# Patient Record
Sex: FEMALE | Race: WHITE
[De-identification: names, ages, dates, MRNs, and addresses within clinical notes are randomized per-mention and may not be internally consistent; named-entity substitution may affect disease eponyms.]

---

## 2021-06-19 ENCOUNTER — HOSPITAL ENCOUNTER (EMERGENCY)
Dept: HOSPITAL 50 - VM.ED | Age: 26
Discharge: HOME | End: 2021-06-19
Payer: COMMERCIAL

## 2021-06-19 DIAGNOSIS — E87.6: ICD-10-CM

## 2021-06-19 DIAGNOSIS — R00.2: ICD-10-CM

## 2021-06-19 DIAGNOSIS — R00.0: Primary | ICD-10-CM

## 2021-06-19 LAB
ANION GAP SERPL CALC-SCNC: 18.2 MMOL/L (ref 5–15)
BARBITURATES UR QL SCN: NEGATIVE
BENZODIAZ UR QL SCN: NEGATIVE
CHLORIDE SERPL-SCNC: 99 MMOL/L (ref 98–107)
METHADONE UR QL SCN: NEGATIVE
SODIUM SERPL-SCNC: 137 MMOL/L (ref 136–145)
THC UR QL SCN>50 NG/ML: NEGATIVE

## 2021-06-19 NOTE — CR
______________________________________________________________________________   

  

[id number] RAD/RAD Chest PA And Lateral  

EXAM: FRONTAL AND LATERAL CHEST  

   

 INDICATION: CHEST PAIN.  

   

 COMPARISON: None.  

   

 DISCUSSION: The heart and lungs are normal in appearance.  

   

 IMPRESSION:    

 1.  Negative exam.  

   

 Electronically signed by Rajat Lagos MD on 6/19/2021 10:51 AM  

   

  

Rajat Lagos MD                 

 06/19/21 1053    

  

Thank you for allowing us to participate in the care of your patient.

## 2021-06-19 NOTE — EDM.PDOC
ED HPI GENERAL MEDICAL PROBLEM





- General


Stated Complaint: DIZZY,LIGHT HEADED


Time Seen by Provider: 06/19/21 09:30


Source of Information: Reports: Patient


History Limitations: Reports: No Limitations





- History of Present Illness


INITIAL COMMENTS - FREE TEXT/NARRATIVE: 





Patient presents to the ED for a feeling of her heart racing, feeling nauseated 

and light headed.  She works at a gas station, is  and is a smoker.  LMP 

5/22.  Denies any new chemical exposure., no recent travel and no recent 

illness.  She states about a week ago she had onset of these symptoms.  They 

were sporadic and not tied to certain activity or time of day.  Denies any new 

over the counter supplements, rare use of alcohol, no illicit drug use, no tick 

or bug bites.  Has not had covid, nor the vaccines.  She became concerned about 

the symptoms and saw her provider 3 days ago.  She was checked for pregnancy and

told it was negative.  She was put on steroids for some nasal inflammation, with

concerns of allergies and given meclizine for concern for her dizziness.  She is

taking 20 mg of prednisone daily last use was last night.  She is using the 

meclizine, last use was last night.  She does not feel the medications are 

helping.  She got up today, had her coffee and some almonds and went to work.  

Felt nauseated and light headed.  Heart feels like it is racing.  Decided to 

come in today.  Denies vaping, no energy drink use.  No family history of 

stroke, heart attack or heart problems


Onset Date: 06/12/21


Duration: Hour(s):, Getting Worse, Intermittent, Recurring


Severity: Moderate


Improves with: Reports: None


Associated Symptoms: Reports: Nausea/Vomiting, Other (lightheaded)





- Related Data


                                    Allergies











Allergy/AdvReac Type Severity Reaction Status Date / Time


 


No Known Allergies Allergy   Verified 06/19/21 09:57











Home Meds: 


                                    Home Meds





Meclizine [Antivert] 25 mg PO TID PRN 06/19/21 [History]











ED ROS GENERAL





- Review of Systems


Review Of Systems: See Below


Constitutional: Reports: No Symptoms


HEENT: Reports: No Symptoms


Respiratory: Reports: No Symptoms


Cardiovascular: Reports: Lightheadedness, Palpitations


Endocrine: Reports: No Symptoms


GI/Abdominal: Reports: No Symptoms


: Reports: No Symptoms


Musculoskeletal: Reports: No Symptoms


Skin: Reports: No Symptoms


Neurological: Reports: No Symptoms


Psychiatric: Reports: No Symptoms


Hematologic/Lymphatic: Reports: No Symptoms


Immunologic: Reports: No Symptoms





ED EXAM, GENERAL





- Physical Exam


Exam: See Below


Exam Limited By: No Limitations


General Appearance: Alert, No Apparent Distress, Anxious


Eye Exam: Bilateral Eye: Other (PERRLA, EOM, no nystagmus)


Ears: Normal External Exam, Normal Canal, Normal TMs


Ear Exam: Bilateral Ear: Auricle Normal


Nose: Normal Inspection, Normal Mucosa, No Blood


Throat/Mouth: Normal Inspection, Normal Lips, Normal Teeth, Other (mild dry 

mucous membranes)


Head: Atraumatic


Neck: Normal Inspection, Supple, Non-Tender.  No: Thyromegaly


Respiratory/Chest: No Respiratory Distress, Lungs Clear, Normal Breath Sounds


Cardiovascular: Normal Peripheral Pulses, No Edema, No Murmur, Tachycardia, 

Extra Beats


GI/Abdominal: Normal Bowel Sounds, Soft, Non-Tender, No Organomegaly, No 

Distention


 (Female) Exam: Deferred


Extremities: Normal Inspection, Normal Range of Motion, Non-Tender, No Pedal 

Edema


Neurological: Alert, Oriented, CN II-XII Intact, Normal Cognition, Normal Gait, 

No Motor/Sensory Deficits


Psychiatric: Normal Affect


Skin Exam: Warm, Dry, Intact, Normal Color, No Rash


  ** #1 Interpretation


EKG Date: 06/19/21


Time: 09:25


Rhythm: Other (trigeminy)


Rate (Beats/Min): 109


Axis: Normal


QT: Prolonged (499)


Comparison: NA - No Prior EKG


EKG Interpretation Comments: 





frequent PVC





Course





- Vital Signs


Last Recorded V/S: 


                                Last Vital Signs











Temp  36.5 C   06/19/21 09:20


 


Pulse  98   06/19/21 09:50


 


Resp  16   06/19/21 09:50


 


BP  168/90 H  06/19/21 09:50


 


Pulse Ox  99   06/19/21 09:50














- Orders/Labs/Meds


Orders: 


                               Active Orders 24 hr











 Category Date Time Status


 


 Cardiac Monitoring [RC] .AS DIRECTED Care  06/19/21 09:36 Ordered


 


 EKG 12 Lead [EKG Documentation Completion] [RC] STAT Care  06/19/21 09:19 

Active


 


 Chest 2V [CR] Stat Exams  06/19/21 10:11 Ordered











Labs: 


                                Laboratory Tests











  06/19/21 06/19/21 06/19/21 Range/Units





  09:40 09:40 09:40 


 


WBC  9.8    (4.0-10.0)  x10^3/uL


 


RBC  4.51    (4.00-5.50)  x10^6/uL


 


Hgb  14.5    (12.0-16.0)  g/dL


 


Hct  41.4    (33.0-47.0)  %


 


MCV  91.8    (78.0-93.0)  fL


 


MCH  32.2 H    (26.0-32.0)  pg


 


MCHC  35.0    (32.0-36.0)  g/dL


 


RDW Coeff of Pauly  12.2    (10.0-15.0)  %


 


Plt Count  270    (130-400)  x10^3/uL


 


Neut % (Auto)  68.5    (50.0-80.0)  %


 


Lymph % (Auto)  23.8 L    (25.0-50.0)  %


 


Mono % (Auto)  7.3    (2.0-11.0)  %


 


Eos % (Auto)  0.1    (0.0-4.0)  %


 


Baso % (Auto)  0.3    (0.2-1.2)  %


 


D-Dimer, Quantitative   0.22   (<=0.58)  mg/LFEU


 


Sodium    137  (136-145)  mmol/L


 


Potassium    3.2 L  (3.5-5.1)  mmol/L


 


Chloride    99  ()  mmol/L


 


Carbon Dioxide    23  (21-32)  mmol/L


 


Anion Gap    18.2 H  (5-15)  mmol/L


 


BUN    13  (7-18)  mg/dL


 


Creatinine    0.8  (0.55-1.02)  mg/dL


 


Est Cr Clr Drug Dosing    106.56  mL/min


 


Estimated GFR (MDRD)    > 60  


 


Glucose    89  (70-99)  mg/dL


 


Calcium    8.8  (8.5-10.1)  mg/dL


 


Corrected Calcium    8.7  (8.5-10.1)  mg/dL


 


Magnesium    2.0  (1.8-2.4)  mg/dL


 


Total Bilirubin    0.7  (0.2-1.0)  mg/dL


 


AST    17  (15-37)  U/L


 


ALT    17  (14-59)  U/L


 


Alkaline Phosphatase    58  ()  U/L


 


Troponin I High Sens    < 4  (<=51)  ng/L


 


Total Protein    7.9  (6.4-8.2)  g/dL


 


Albumin    4.1  (3.4-5.0)  g/dL


 


Globulin    3.8  


 


Albumin/Globulin Ratio    1.08  


 


TSH, Ultra Sensitive    1.514  (0.358-3.74)  uIU/mL


 


Urine Color     (YELLOW)  


 


Urine Appearance     (CLEAR)  


 


Urine pH     (5.0-8.0)  


 


Ur Specific Gravity     


 


Urine Protein     (NEGATIVE)  mg/dL


 


Urine Glucose (UA)     (NEGATIVE)  mg/dL


 


Urine Ketones     (NEGATIVE)  mg/dL


 


Urine Occult Blood     (NEGATIVE)  


 


Urine Nitrite     (NEGATIVE)  


 


Urine Bilirubin     (NEGATIVE)  


 


Urine Urobilinogen     (0.2)  EU/dL


 


Ur Leukocyte Esterase     (NEGATIVE)  


 


Urine HCG, Qual     (NEGATIVE)  


 


Urine Opiates Screen     (NEGATIVE)  


 


Ur Buprenorphine Scrn     (NEGATIVE)  


 


Ur Oxycodone Screen     (NEGATIVE)  


 


Urine Methadone Screen     (NEGATIVE)  


 


Ur Barbiturates Screen     (NEGATIVE)  


 


Ur Phencyclidine Scrn     (NEGATIVE)  


 


Ur Amphetamine Screen     (NEGATIVE)  


 


U Methamphetamines Scrn     (NEGATIVE)  


 


Urine MDMA Screen     (NEGATIVE)  


 


U Benzodiazepines Scrn     (NEGATIVE)  


 


U Cocaine Metab Screen     (NEGATIVE)  


 


U Marijuana (THC) Screen     (NEGATIVE)  


 


Ethyl Alcohol    < 3  (0-3)  mg/dL














  06/19/21 06/19/21 06/19/21 Range/Units





  09:50 09:50 09:50 


 


WBC     (4.0-10.0)  x10^3/uL


 


RBC     (4.00-5.50)  x10^6/uL


 


Hgb     (12.0-16.0)  g/dL


 


Hct     (33.0-47.0)  %


 


MCV     (78.0-93.0)  fL


 


MCH     (26.0-32.0)  pg


 


MCHC     (32.0-36.0)  g/dL


 


RDW Coeff of Pauly     (10.0-15.0)  %


 


Plt Count     (130-400)  x10^3/uL


 


Neut % (Auto)     (50.0-80.0)  %


 


Lymph % (Auto)     (25.0-50.0)  %


 


Mono % (Auto)     (2.0-11.0)  %


 


Eos % (Auto)     (0.0-4.0)  %


 


Baso % (Auto)     (0.2-1.2)  %


 


D-Dimer, Quantitative     (<=0.58)  mg/LFEU


 


Sodium     (136-145)  mmol/L


 


Potassium     (3.5-5.1)  mmol/L


 


Chloride     ()  mmol/L


 


Carbon Dioxide     (21-32)  mmol/L


 


Anion Gap     (5-15)  mmol/L


 


BUN     (7-18)  mg/dL


 


Creatinine     (0.55-1.02)  mg/dL


 


Est Cr Clr Drug Dosing     mL/min


 


Estimated GFR (MDRD)     


 


Glucose     (70-99)  mg/dL


 


Calcium     (8.5-10.1)  mg/dL


 


Corrected Calcium     (8.5-10.1)  mg/dL


 


Magnesium     (1.8-2.4)  mg/dL


 


Total Bilirubin     (0.2-1.0)  mg/dL


 


AST     (15-37)  U/L


 


ALT     (14-59)  U/L


 


Alkaline Phosphatase     ()  U/L


 


Troponin I High Sens     (<=51)  ng/L


 


Total Protein     (6.4-8.2)  g/dL


 


Albumin     (3.4-5.0)  g/dL


 


Globulin     


 


Albumin/Globulin Ratio     


 


TSH, Ultra Sensitive     (0.358-3.74)  uIU/mL


 


Urine Color  Yellow    (YELLOW)  


 


Urine Appearance  Clear    (CLEAR)  


 


Urine pH  6.5    (5.0-8.0)  


 


Ur Specific Gravity  1.010    


 


Urine Protein  Negative    (NEGATIVE)  mg/dL


 


Urine Glucose (UA)  Negative    (NEGATIVE)  mg/dL


 


Urine Ketones  Negative    (NEGATIVE)  mg/dL


 


Urine Occult Blood  Negative    (NEGATIVE)  


 


Urine Nitrite  Negative    (NEGATIVE)  


 


Urine Bilirubin  Negative    (NEGATIVE)  


 


Urine Urobilinogen  0.2    (0.2)  EU/dL


 


Ur Leukocyte Esterase  Negative    (NEGATIVE)  


 


Urine HCG, Qual    Negative  (NEGATIVE)  


 


Urine Opiates Screen   Negative   (NEGATIVE)  


 


Ur Buprenorphine Scrn   Negative   (NEGATIVE)  


 


Ur Oxycodone Screen   Negative   (NEGATIVE)  


 


Urine Methadone Screen   Negative   (NEGATIVE)  


 


Ur Barbiturates Screen   Negative   (NEGATIVE)  


 


Ur Phencyclidine Scrn   Negative   (NEGATIVE)  


 


Ur Amphetamine Screen   Negative   (NEGATIVE)  


 


U Methamphetamines Scrn   Negative   (NEGATIVE)  


 


Urine MDMA Screen   Negative   (NEGATIVE)  


 


U Benzodiazepines Scrn   Negative   (NEGATIVE)  


 


U Cocaine Metab Screen   Negative   (NEGATIVE)  


 


U Marijuana (THC) Screen   Negative   (NEGATIVE)  


 


Ethyl Alcohol     (0-3)  mg/dL











Meds: 


Medications














Discontinued Medications














Generic Name Dose Route Start Last Admin





  Trade Name Freq  PRN Reason Stop Dose Admin


 


Sodium Chloride  1,000 mls @ 999 mls/hr  06/19/21 09:38  06/19/21 09:46





  Normal Saline  IV  06/19/21 10:38  999 mls/hr





  ONETIME ONE   Administration


 


Ondansetron HCl  4 mg  06/19/21 09:38  06/19/21 09:50





  Ondansetron 4 Mg/2 Ml Sdv  IVPUSH  06/19/21 09:39  4 mg





  ONETIME ONE   Administration


 


Potassium Chloride  40 meq  06/19/21 10:26  06/19/21 10:39





  Potassium Chloride 20 Meq Tab.Er  PO  06/19/21 10:27  Not Given





  ONETIME ONE  


 


Potassium Chloride  40 meq  06/19/21 10:35  06/19/21 10:39





  Potassium Chloride 10% 20 Meq/15 Ml Soln 15 Ml Ud Cup  PO  06/19/21 10:36  40 

meq





  ONETIME ONE   Administration














- Radiology Interpretation


Free Text/Narrative:: 





chest x-ray interpreted by radiology as negative





- Re-Assessments/Exams


Free Text/Narrative Re-Assessment/Exam: 





06/19/21 09:49


Patient presents with mildl tachycardia, lightheadedness.  Will give IV fluids 

as clinically dry and dehydration could be leading to her symptoms. Has mild 

nausea, will give zofran 4 mg IVP.  Has ventricular trigeminy on EKG, frequent 

PVC.  Will keep on monitor, check labs to include troponin, d dimer, tsh, 

alcohol and drug screen along with urine pregnancy.  Normal neurological exam. 


06/19/21 10:03


heart rate 88 and no pvcs now.  normal sinus, fluids infusing normal WBC


06/19/21 10:40


Been up to the bathroom several times., gap is elevated and potassium is low.  

encourage hydration, will give 40 meq potassium po ( liquid) can't take pills). 

Discussed with patient potassium rich foods, follow up with PCP for possible 

holter monitor and hydration. 





Departure





- Departure


Time of Disposition: 10:41


Disposition: Home, Self-Care 01


Condition: Good


Clinical Impression: 


 Palpitation, Tachycardia, Hypokalemia








- Discharge Information


*PRESCRIPTION DRUG MONITORING PROGRAM REVIEWED*: Not Applicable


*COPY OF PRESCRIPTION DRUG MONITORING REPORT IN PATIENT HARVEY: Not Applicable


Instructions:  Hypokalemia, Sinus Tachycardia, Palpitations, Easy-to-Read


Additional Instructions: 


Increase food rich in potassium, stay well hydrated.  You are encouraged to 

limit caffeine and alcohol until follow up with your PCP.  It has been 

recommended that you talk to your PCP about a holter monitor for the elevated 

heart rate and multiple extra heart beats ( PVCs).  Call you PCP in two days to 

discuss this. 





Sepsis Event Note (ED)





- Focused Exam


Vital Signs: 


                                   Vital Signs











  Temp Pulse Resp BP Pulse Ox


 


 06/19/21 09:50   98  16  168/90 H  99


 


 06/19/21 09:20  36.5 C  107 H  20  164/96 H  99














- My Orders


Last 24 Hours: 


My Active Orders





06/19/21 09:19


EKG 12 Lead [EKG Documentation Completion] [RC] STAT 





06/19/21 09:36


Cardiac Monitoring [RC] .AS DIRECTED 





06/19/21 10:11


Chest 2V [CR] Stat 














- Assessment/Plan


Last 24 Hours: 


My Active Orders





06/19/21 09:19


EKG 12 Lead [EKG Documentation Completion] [RC] STAT 





06/19/21 09:36


Cardiac Monitoring [RC] .AS DIRECTED 





06/19/21 10:11


Chest 2V [CR] Stat